# Patient Record
Sex: FEMALE | Race: WHITE | Employment: OTHER | URBAN - METROPOLITAN AREA
[De-identification: names, ages, dates, MRNs, and addresses within clinical notes are randomized per-mention and may not be internally consistent; named-entity substitution may affect disease eponyms.]

---

## 2019-07-03 ENCOUNTER — OFFICE VISIT (OUTPATIENT)
Dept: VASCULAR SURGERY | Facility: CLINIC | Age: 74
End: 2019-07-03
Payer: MEDICARE

## 2019-07-03 DIAGNOSIS — Z53.9 ERRONEOUS ENCOUNTER--DISREGARD: Primary | ICD-10-CM

## 2019-07-03 PROCEDURE — 99205 OFFICE O/P NEW HI 60 MIN: CPT | Performed by: SURGERY

## 2019-07-03 NOTE — PROGRESS NOTES
VEINSOLUTIONS CONSULTATION    HPI:    Anabelle Esparza is a pleasant 74 year old female referred by Dr. Cesar Altamirano who presents with complaints of left greater than right lower extremity pain and varicose veins.  She has a history of a left lower extremity venous stasis ulcer that healed a few years ago.  She also bumped her left medial ankle recently, sustaining 2 small wounds, both of which have essentially healed.  She had radiofrequency ablation of her great saphenous veins bilaterally in 2017 only to have them recanalize and her symptoms recur.  Varicose veins have been present for more than 40 years. The pain is described as an aching, tiredness, heaviness, numbness, worse when standing for long periods of time, and warm weather and improving with leg elevation and walking.  The patient has used compression hose for years.    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  An extensive work-up did not reveal a good source for the stroke but did reveal a patent foramen ovale which was believed to be the source of a thrombus to her right cerebral hemisphere.  She has regained 95% of function in her left arm but still admits that she gets fatigued easily.  She was evaluated at the [facility] for possible septal defect repair but, she states that after the vision looked at her legs, he stated that they would just continue conservative measures.    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, hysterectomy 1989, breast cancer 2017 with no recurrence and no treatment    PAST SURGICAL HISTORY: Please see above    FAMILY HISTORY: Varicose veins    SOCIAL HISTORY:   She is a non-smoker but has 1-2 drinks of alcohol weekly    REVIEW OF SYSTEMS: Review Of Systems  Skin: negative  Eyes: negative  Ears/Nose/Throat: negative  Respiratory: No  shortness of breath, dyspnea on exertion, cough, or hemoptysis  Cardiovascular: negative  Gastrointestinal: negative  Genitourinary: negative  Musculoskeletal: arthritis  Neurologic: stroke  Psychiatric: negative  Hematologic/Lymphatic/Immunologic: negative  Endocrine: hot flashes      Vital signs:  There were no vitals taken for this visit.     Medicines:  Xarelto, levothyroxine, triamterene, anastrozole, baby aspirin      PHYSICAL EXAM:  General: Pleasant, NAD.   HEENT: Normocephalic, atraumatic, external ears and nose normal.   Respiratory: Normal respiratory effort.   Cardiovascular: Pulse is regular.   Musculoskeletal: Gait and station normal.  The joints of her fingers and toes without deformity.  There is no cyanosis of her nailbeds.   EXTREMITIES: Her right lower extremity, she has a large scar on her right mid anterior thigh from a sarcoma excision.  There are 4 to 6 mm varicosities coursing down the right distal medial thigh and 5 to 6 mm varicosities about the right medial calf.  There are stasis changes of hyperpigmentation from the mid leg to the ankle on the right.  There is mild lipodermatosclerosis.  There is trace edema of her ankle and foot.  In the left lower extremity, she has 8 mm varicosities coursing from the mid medial left thigh, medial to the left knee down to the medial aspect of the left leg.  There is lipodermatosclerosis from the mid leg to the ankle with a large brawny scar from a venous stasis ulcer about her left medial ankle.    PULSES: R/L (3=normal pulse, 0=no palpable pulse) dorsalis pedis: 3/3; posterior tibial: 3/3.      Neurologic: Slight left upper extremity weakness but otherwise grossly normal  Psychiatric: Mood, affect, judgment and insight are normal     Venous Duplex Ultrasound:   Outside ultrasound dated 5/28/2019 reveals no evidence of right lower extremity deep vein thrombosis.  The right great saphenous vein is incompetent in its entirety, measures 11.4 mm proximally  and 7.1 mm at the mid thigh.  The right small saphenous vein is incompetent in the mid to distal calf.  No other veins were discussed and the ultrasound  In the left lower extremity is no evidence of deep vein thrombosis.  There is no assessment for deep vein valve incompetence.  The left great saphenous vein is incompetent throughout.  It is dilated to 23.6 mm proximally, 10.7 mm in the mid thigh.  The left small saphenous vein is incompetent but measures only 3.6 mm in diameter maximally.  There is no mention of any other veins in the left lower extremity.    ASSESSMENT:  CEAP 4 right lower extremity chronic venous insufficiency and CEAP 5 left lower extremity chronic venous insufficiency.  She has suffered a left lower extremity venous stasis ulcer and is troubled by pain in left leg greater than the right leg.    Discussed options of continued conservative management with use of compression hose on a daily basis, leg elevation, dietary measures and physical activity.  Given her advanced stasis changes, she is at risk for progression of the disease process.    We discussed options of treatment including thermal and nonthermal techniques.  Given the size of her left great saphenous vein, I feel that redo radiofrequency ablation might be a better technique for her.  I would simply treat each segment with 2 or 3 RF sessions.  If this was not successful, I would consider Cyanoacrylate glue ablation of the saphenous veins.    Given her stroke secondary to a patent foramen ovale, she is at higher risk for embolic stroke from any deep vein thrombosis.  Is currently on Xarelto and would be maintained on the Xarelto throughout the treatment.  This would decrease her risk.    We discussed risks of radiofrequency ablation of the great saphenous veins bilaterally including bleeding, infection, nerve injury, scarring, hyperpigmentation, deep vein thrombosis, recanalization of the saphenous veins and failure of the visible  varicosities on her legs to resolve.  He may require ultrasound-guided sclerotherapy of the residual varicosities because I would be hesitant to discontinue her anticoagulation given her previous stroke.    PLAN:  We discussed bilateral lower extremity radiofrequency ablation of her great saphenous veins.  Estimates were given.  We will seek insurance approval and will notify the patient.  This will be performed with oral sedation and local anesthesia in the office setting.  She expressed some hesitancy with being awake during the procedure but I reassured her that this sedation should be adequate to allow her to be comfortable.     Bryce Young MD    Please send a copy of this dictation to Dr. Cesar Altamirano and a copy to Jhonatan Shin

## 2019-08-07 ENCOUNTER — OFFICE VISIT (OUTPATIENT)
Dept: VASCULAR SURGERY | Facility: CLINIC | Age: 74
End: 2019-08-07
Payer: MEDICARE

## 2019-08-07 ENCOUNTER — APPOINTMENT (OUTPATIENT)
Dept: VASCULAR SURGERY | Facility: CLINIC | Age: 74
End: 2019-08-07
Payer: MEDICARE

## 2019-08-07 DIAGNOSIS — Z53.9 ERRONEOUS ENCOUNTER--DISREGARD: Primary | ICD-10-CM

## 2019-08-07 PROCEDURE — 99207 ZZC VEINSOLUTIONS NO CHARGE VISIT: CPT | Performed by: SURGERY

## 2019-08-07 PROCEDURE — 36475 ENDOVENOUS RF 1ST VEIN: CPT | Mod: 50 | Performed by: SURGERY

## 2019-08-07 NOTE — PROGRESS NOTES
[facility]  [facility] [employer] Operative Report    Preoperative diagnosis:    1.  CEAP 4 right lower extremity chronic venous insufficiency  2.  Recurrent right lower extremity pain and varicose veins secondary to recanalization of the right great saphenous vein; status post radiofrequency ablation right great saphenous vein  3.  CEAP 5 left lower extremity chronic venous insufficiency  4.  Recurrent left lower extremity pain and varicose veins secondary to recanalization of the left great saphenous vein; status post radiofrequency ablation of the left great saphenous vein    Post operative diagnosis:  Same    Procedure:  1.  Redo radiofrequency ablation right great saphenous vein  2.  Redo radiofrequency ablation left great saphenous vein    Preoperative medications: 2 mg ativan, 0.1 mg clonidine    Surgeon  Bryce Young MD    First assistant  Amy Berman CST/CSFA    Operative description  Indications: Advanced venous stasis disease bilateral lower extremities with recanalization of the bilateral great saphenous veins; status post bilateral great saphenous vein ablation    Procedure: Details the procedure including risks of bleeding, infection, nerve injury, scarring, hyperpigmentation, deep vein thrombosis, recanalization of the close veins and failure of all the varicose veins to resolve were discussed.  Patient appeared to understand and wish to proceed.  Informed consent had been obtained previously.    The patient had undergone radio frequency ablation of the great saphenous veins bilaterally in the past and had recurrent varicose veins and pain with CEAP 4 right lower extremity chronic venous insufficiency and CEAP 5 left lower extremity chronic venous insufficiency.  Her outside ultrasound suggested that her great saphenous veins had recanalized a more the source of the varicose veins bilaterally.    VNUS: We took the patient to the operating room, had her lie supine the operating  table, then prepped and draped both groins and both lower extremity sterilely.  We took a timeout to confirm the appropriate operative site and procedure: Radiofrequency ablation of the great saphenous veins bilaterally.    I interrogated the left lower extremity with ultrasound and noted that the great saphenous vein was patent into the proximal third of the left thigh before feeding the large tributary coursing down the medial left thigh.  Distributed coursed all the way down the medial leg toward the ankle.  The great saphenous vein was not visible in the distal thigh.  There was a short segment of the great saphenous vein in the distal third of the left leg that was open.  I chose to access the vein in the junction between the proximal and mid thirds of the left thigh just as a large tributary course from the great saphenous vein.  Infiltrated the skin overlying the vein at this level with 1% lidocaine with bicarbonate, placed a micro puncture needle followed by a guidewire and a 7 [place] sheath.  We then passed our closure fast device positioning the tip of the device 2.15 cm from the saphenofemoral junction under ultrasound guidance.    I then imaged the right lower extremity and noted that the great saphenous vein was absent beyond the proximal third of the left thigh down to the distal leg.  There were segments of the vein that were patent but tortuous with multiple varicosities coursing down the left leg.  I chose to access the vein just as it broke through the fascia supplying a varicosity on the right anteromedial thigh.  Infiltrated the skin overlying the vein at this level with 1% lidocaine with bicarbonate, placed a micropuncture needle followed by a micropuncture guidewire.  We left a micropuncture guidewire in the great saphenous vein.    Tumescent anesthetic was injected along the course of the left great saphenous vein under ultrasound guidance with care to confirm that the tip of the closure fast  device lay 2.15 cm from the left saphenofemoral junction with the operative table in Trendelenburg position.  After allowing the block to take effect, I treated each segment of the great saphenous vein in the proximal third of the left thigh with 3 RF sessions each.  Care was taken to ensure that the great saphenous vein was circumferentially bathe in solution, especially as the vein was dilated in the proximal thigh.  Patient was comfortable throughout the treatment.  After completing the pullback I reimaged the vein and found to be noncompressible.  The saphenofemoral junction and common femoral veins were fully compressible and free of thrombus.  We removed the sheath and the catheter again hemostasis with pressure.    I then passed a 7 [place] sheath over the guidewire into the right great saphenous vein in the proximal third of the thigh.  The closure fast device was passed and the tip positioned 2.11 cm from the right saphenofemoral junction under ultrasound guidance.  Tumescent anesthetic was injected along the course of the right great saphenous vein as described on the left and the block allowed to take effect.  After once again confirming catheter to position, I treated the first 2 segments of the great saphenous vein with 3 RF sessions each given the fact that this was a recanalized vein.  The patient felt some discomfort with the second segment that we treated prompting me to stop and instilled more tumescent anesthetic, after which time the patient felt no more discomfort.  After completing the pullback I reimaged the vein and found the vein to be noncompressible.  The saphenofemoral junction and common femoral veins were fully compressible free of thrombus.  The sheath and the catheter were removed and hemostasis secured with pressure.    The legs were cleaned with saline solution and small gauze and Tegaderm dressings applied to the vein access sites.  We then placed a thigh-high compression hose.  We  observe the patient for 30 minutes to ensure excellent hemostasis then took her to her car in a wheelchair.  Postoperative instructions were given to the patient and her  and both verbal and written form.  They both voiced understanding and their questions were answered.  He tolerated the procedure well without evidence of allergic reaction or other complications.    She will return in 5 days for a bilateral lower extremity venous duplex ultrasound at her home [facility].  Results will be given to me that day.    Gabby Longbehn RN monitored blood pressure, pulse and pulse oximetry continuously throughout the procedure.    VNUS Procedure:    Pharmacologic agents:   Local anesthesia:   10 mL Lidocaine 1% with Epinephrine + 1 mL Sodium Bicarbonate 8.4%    Total injected volume: 9 mL    Tumescent Anesthesia: 500 ml bag 0.9% Sodium Chloride +60 ml 1% lidocaine with epi 1:100,000 +12 ml 8.4% Sodium Bicarbonate (total volume: 572ml per bag)  Total injected volume: 327 mL    Use of ultrasound guidance:  Yes  Start time: 1234  End time: 124  Catheter insertion site: Junction between the proximal and mid thirds of the thigh bilaterally  Length of vein treated: 23.5 cm right leg, 25 cm left leg  Treatment with 7 RF cycles for 2 minutes and 11 seconds right leg, 8 RF cycles for 2 minutes 4 seconds on the left leg.      Bryce Young MD    Please send a copy of this dictation to Dr. Cesar Altamirano

## 2019-09-17 ENCOUNTER — APPOINTMENT (OUTPATIENT)
Dept: VASCULAR SURGERY | Facility: CLINIC | Age: 74
End: 2019-09-17
Payer: MEDICARE

## 2019-09-17 PROCEDURE — 99207 ZZC VEINSOLUTIONS POST OPERATIVE VISIT: CPT | Performed by: SURGERY

## 2020-03-25 DIAGNOSIS — I83.813 VARICOSE VEINS OF BOTH LOWER EXTREMITIES WITH PAIN: Primary | ICD-10-CM
